# Patient Record
Sex: FEMALE | Race: WHITE | HISPANIC OR LATINO
[De-identification: names, ages, dates, MRNs, and addresses within clinical notes are randomized per-mention and may not be internally consistent; named-entity substitution may affect disease eponyms.]

---

## 2020-01-27 PROBLEM — Z00.00 ENCOUNTER FOR PREVENTIVE HEALTH EXAMINATION: Status: ACTIVE | Noted: 2020-01-27

## 2020-02-03 ENCOUNTER — APPOINTMENT (OUTPATIENT)
Dept: ANTEPARTUM | Facility: CLINIC | Age: 30
End: 2020-02-03
Payer: COMMERCIAL

## 2020-02-03 ENCOUNTER — ASOB RESULT (OUTPATIENT)
Age: 30
End: 2020-02-03

## 2020-02-03 PROCEDURE — 36416 COLLJ CAPILLARY BLOOD SPEC: CPT

## 2020-02-03 PROCEDURE — 76813 OB US NUCHAL MEAS 1 GEST: CPT

## 2020-02-04 ENCOUNTER — TRANSCRIPTION ENCOUNTER (OUTPATIENT)
Age: 30
End: 2020-02-04

## 2020-02-07 LAB
1ST TRIMESTER DATA: NORMAL
ADDENDUM DOC: NORMAL
AFP PNL SERPL: NORMAL
AFP SERPL-ACNC: NORMAL
CLINICAL BIOCHEMIST REVIEW: NORMAL
FREE BETA HCG 1ST TRIMESTER: NORMAL
Lab: NORMAL
NASAL BONE: PRESENT
NOTES NTD: NORMAL
NT: NORMAL
PAPP-A SERPL-ACNC: NORMAL
TRISOMY 18/3: NORMAL

## 2020-02-08 ENCOUNTER — APPOINTMENT (OUTPATIENT)
Dept: ANTEPARTUM | Facility: CLINIC | Age: 30
End: 2020-02-08

## 2020-02-08 ENCOUNTER — APPOINTMENT (OUTPATIENT)
Dept: MATERNAL FETAL MEDICINE | Facility: CLINIC | Age: 30
End: 2020-02-08
Payer: COMMERCIAL

## 2020-02-08 ENCOUNTER — ASOB RESULT (OUTPATIENT)
Age: 30
End: 2020-02-08

## 2020-02-08 PROCEDURE — 99201 OFFICE OUTPATIENT NEW 10 MINUTES: CPT

## 2020-02-20 ENCOUNTER — APPOINTMENT (OUTPATIENT)
Dept: ANTEPARTUM | Facility: CLINIC | Age: 30
End: 2020-02-20

## 2020-02-20 ENCOUNTER — APPOINTMENT (OUTPATIENT)
Dept: MATERNAL FETAL MEDICINE | Facility: CLINIC | Age: 30
End: 2020-02-20

## 2020-02-24 ENCOUNTER — APPOINTMENT (OUTPATIENT)
Dept: ANTEPARTUM | Facility: CLINIC | Age: 30
End: 2020-02-24

## 2020-02-25 ENCOUNTER — APPOINTMENT (OUTPATIENT)
Dept: ANTEPARTUM | Facility: CLINIC | Age: 30
End: 2020-02-25
Payer: COMMERCIAL

## 2020-02-25 ENCOUNTER — LABORATORY RESULT (OUTPATIENT)
Age: 30
End: 2020-02-25

## 2020-02-25 ENCOUNTER — ASOB RESULT (OUTPATIENT)
Age: 30
End: 2020-02-25

## 2020-02-25 ENCOUNTER — APPOINTMENT (OUTPATIENT)
Dept: MATERNAL FETAL MEDICINE | Facility: CLINIC | Age: 30
End: 2020-02-25
Payer: COMMERCIAL

## 2020-02-25 PROCEDURE — 99213 OFFICE O/P EST LOW 20 MIN: CPT

## 2020-02-25 PROCEDURE — 76946 ECHO GUIDE FOR AMNIOCENTESIS: CPT

## 2020-02-25 PROCEDURE — 76811 OB US DETAILED SNGL FETUS: CPT

## 2020-03-24 ENCOUNTER — APPOINTMENT (OUTPATIENT)
Dept: ANTEPARTUM | Facility: CLINIC | Age: 30
End: 2020-03-24

## 2020-03-25 ENCOUNTER — ASOB RESULT (OUTPATIENT)
Age: 30
End: 2020-03-25

## 2020-03-25 ENCOUNTER — APPOINTMENT (OUTPATIENT)
Dept: ANTEPARTUM | Facility: CLINIC | Age: 30
End: 2020-03-25
Payer: COMMERCIAL

## 2020-03-25 ENCOUNTER — APPOINTMENT (OUTPATIENT)
Dept: MATERNAL FETAL MEDICINE | Facility: CLINIC | Age: 30
End: 2020-03-25
Payer: COMMERCIAL

## 2020-03-25 VITALS
OXYGEN SATURATION: 98 % | HEART RATE: 70 BPM | DIASTOLIC BLOOD PRESSURE: 70 MMHG | RESPIRATION RATE: 18 BRPM | SYSTOLIC BLOOD PRESSURE: 114 MMHG | BODY MASS INDEX: 24.49 KG/M2 | WEIGHT: 152.38 LBS | HEIGHT: 66 IN

## 2020-03-25 VITALS — TEMPERATURE: 98 F

## 2020-03-25 DIAGNOSIS — Z82.49 FAMILY HISTORY OF ISCHEMIC HEART DISEASE AND OTHER DISEASES OF THE CIRCULATORY SYSTEM: ICD-10-CM

## 2020-03-25 DIAGNOSIS — Z83.49 FAMILY HISTORY OF OTHER ENDOCRINE, NUTRITIONAL AND METABOLIC DISEASES: ICD-10-CM

## 2020-03-25 DIAGNOSIS — Z86.2 PERSONAL HISTORY OF DISEASES OF THE BLOOD AND BLOOD-FORMING ORGANS AND CERTAIN DISORDERS INVOLVING THE IMMUNE MECHANISM: ICD-10-CM

## 2020-03-25 PROCEDURE — 76811 OB US DETAILED SNGL FETUS: CPT

## 2020-03-25 PROCEDURE — 99243 OFF/OP CNSLTJ NEW/EST LOW 30: CPT | Mod: 25

## 2020-03-25 RX ORDER — VITAMIN C, CALCIUM, IRON, VITAMIN D3, VITAMIN E, VITAMIN B1, VITAMIN B2, VITAMIN B3, VITAMIN B6, FOLIC ACID, IODINE, ZINC, COPPER, DOCUSATE SODIUM, DOCOSAHEXAENOIC ACID (DHA) 27-1-50 MG
KIT ORAL
Refills: 0 | Status: ACTIVE | COMMUNITY

## 2020-03-25 RX ORDER — OMEGA-3/DHA/EPA/FISH OIL 300-1000MG
1000 CAPSULE ORAL
Refills: 0 | Status: ACTIVE | COMMUNITY

## 2020-06-15 ENCOUNTER — ASOB RESULT (OUTPATIENT)
Age: 30
End: 2020-06-15

## 2020-06-15 ENCOUNTER — APPOINTMENT (OUTPATIENT)
Dept: ANTEPARTUM | Facility: CLINIC | Age: 30
End: 2020-06-15
Payer: COMMERCIAL

## 2020-06-15 PROCEDURE — 76820 UMBILICAL ARTERY ECHO: CPT

## 2020-06-15 PROCEDURE — 76821 MIDDLE CEREBRAL ARTERY ECHO: CPT

## 2020-06-15 PROCEDURE — 76819 FETAL BIOPHYS PROFIL W/O NST: CPT

## 2020-06-15 PROCEDURE — 76816 OB US FOLLOW-UP PER FETUS: CPT

## 2020-06-15 PROCEDURE — 93976 VASCULAR STUDY: CPT

## 2020-07-13 ENCOUNTER — ASOB RESULT (OUTPATIENT)
Age: 30
End: 2020-07-13

## 2020-07-13 ENCOUNTER — APPOINTMENT (OUTPATIENT)
Dept: ANTEPARTUM | Facility: CLINIC | Age: 30
End: 2020-07-13
Payer: COMMERCIAL

## 2020-07-13 PROCEDURE — 76816 OB US FOLLOW-UP PER FETUS: CPT

## 2020-08-04 ENCOUNTER — INPATIENT (INPATIENT)
Facility: HOSPITAL | Age: 30
LOS: 1 days | Discharge: HOME | End: 2020-08-06
Attending: OBSTETRICS & GYNECOLOGY | Admitting: OBSTETRICS & GYNECOLOGY
Payer: COMMERCIAL

## 2020-08-04 VITALS
RESPIRATION RATE: 16 BRPM | DIASTOLIC BLOOD PRESSURE: 68 MMHG | TEMPERATURE: 98 F | SYSTOLIC BLOOD PRESSURE: 112 MMHG | HEART RATE: 86 BPM

## 2020-08-04 DIAGNOSIS — O47.1 FALSE LABOR AT OR AFTER 37 COMPLETED WEEKS OF GESTATION: ICD-10-CM

## 2020-08-04 DIAGNOSIS — O26.893 OTHER SPECIFIED PREGNANCY RELATED CONDITIONS, THIRD TRIMESTER: ICD-10-CM

## 2020-08-04 LAB
BASOPHILS # BLD AUTO: 0.05 K/UL — SIGNIFICANT CHANGE UP (ref 0–0.2)
BASOPHILS NFR BLD AUTO: 0.4 % — SIGNIFICANT CHANGE UP (ref 0–2)
BLD GP AB SCN SERPL QL: SIGNIFICANT CHANGE UP
EOSINOPHIL # BLD AUTO: 0.09 K/UL — SIGNIFICANT CHANGE UP (ref 0–0.5)
EOSINOPHIL NFR BLD AUTO: 0.7 % — SIGNIFICANT CHANGE UP (ref 0–6)
HCT VFR BLD CALC: 39.2 % — SIGNIFICANT CHANGE UP (ref 34.5–45)
HGB BLD-MCNC: 13.3 G/DL — SIGNIFICANT CHANGE UP (ref 11.5–15.5)
IMM GRANULOCYTES NFR BLD AUTO: 0.5 % — SIGNIFICANT CHANGE UP (ref 0–1.5)
LYMPHOCYTES # BLD AUTO: 15.5 % — SIGNIFICANT CHANGE UP (ref 13–44)
LYMPHOCYTES # BLD AUTO: 2.07 K/UL — SIGNIFICANT CHANGE UP (ref 1–3.3)
MCHC RBC-ENTMCNC: 30.6 PG — SIGNIFICANT CHANGE UP (ref 27–34)
MCHC RBC-ENTMCNC: 33.9 GM/DL — SIGNIFICANT CHANGE UP (ref 32–36)
MCV RBC AUTO: 90.1 FL — SIGNIFICANT CHANGE UP (ref 80–100)
MONOCYTES # BLD AUTO: 0.84 K/UL — SIGNIFICANT CHANGE UP (ref 0–0.9)
MONOCYTES NFR BLD AUTO: 6.3 % — SIGNIFICANT CHANGE UP (ref 2–14)
NEUTROPHILS # BLD AUTO: 10.21 K/UL — HIGH (ref 1.8–7.4)
NEUTROPHILS NFR BLD AUTO: 76.6 % — SIGNIFICANT CHANGE UP (ref 43–77)
PLATELET # BLD AUTO: 199 K/UL — SIGNIFICANT CHANGE UP (ref 150–400)
RBC # BLD: 4.35 M/UL — SIGNIFICANT CHANGE UP (ref 3.8–5.2)
RBC # FLD: 12.6 % — SIGNIFICANT CHANGE UP (ref 10.3–14.5)
SARS-COV-2 RNA SPEC QL NAA+PROBE: SIGNIFICANT CHANGE UP
WBC # BLD: 13.33 K/UL — HIGH (ref 3.8–10.5)
WBC # FLD AUTO: 13.33 K/UL — HIGH (ref 3.8–10.5)

## 2020-08-04 PROCEDURE — 59409 OBSTETRICAL CARE: CPT

## 2020-08-04 RX ORDER — OXYTOCIN 10 UNIT/ML
333.33 VIAL (ML) INJECTION
Qty: 20 | Refills: 0 | Status: DISCONTINUED | OUTPATIENT
Start: 2020-08-04 | End: 2020-08-06

## 2020-08-04 RX ORDER — CITRIC ACID/SODIUM CITRATE 300-500 MG
30 SOLUTION, ORAL ORAL ONCE
Refills: 0 | Status: DISCONTINUED | OUTPATIENT
Start: 2020-08-04 | End: 2020-08-05

## 2020-08-04 RX ORDER — SODIUM CHLORIDE 9 MG/ML
1000 INJECTION, SOLUTION INTRAVENOUS
Refills: 0 | Status: DISCONTINUED | OUTPATIENT
Start: 2020-08-04 | End: 2020-08-05

## 2020-08-04 RX ADMIN — Medication 1000 MILLIUNIT(S)/MIN: at 23:20

## 2020-08-04 RX ADMIN — Medication 30 MILLIGRAM(S): at 23:39

## 2020-08-04 NOTE — OB RN DELIVERY SUMMARY - NS_SEPSISRSKCALC_OBGYN_ALL_OB_FT
GBS status in the 'Prenatal Lab tests/results section' on the OB RN Patient Profile must be documented. EOS calculated successfully. EOS Risk Factor: 0.5/1000 live births ([facility] national incidence); GA=39w3d; Temp=98.8; ROM=0.2; GBS='Negative'; Antibiotics='No antibiotics or any antibiotics < 2 hrs prior to birth'

## 2020-08-04 NOTE — OB PROVIDER H&P - ATTENDING COMMENTS
Patient was seen and evaluated at bedside at approx. 9:50 PM. This was my first encounter with the patient.  29 yo P0 at 39+3 weeks in active labor CAT 1 FHT.  Patient had low PAPPA otherwise uncomplicated pregnancy. Exam as above.  I agree with above resident note. GBS negative. Admit for labor management. Expectant management for now.    JOCELYNN Zuñiga MD

## 2020-08-04 NOTE — OB PROVIDER H&P - ASSESSMENT
Tammi Couch is a 31 yo  at 39 weeks 3days presenting to L&D in active labor.    - continuous fetal heart monitoring  - pt denies wanting an epidural at this moment  - expectant management  - f/u Type and Screen  - f/u Covid  - f/u PNL    Discussed with Dr. Zuñiga Tammi Couch is a 29 yo  at 39 weeks 3days presenting to L&D in active labor.    - admission labs  - continuous fetal heart monitoring  - pt denies wanting an epidural at this moment  - expectant management  - baby at increased risk of Trisomy 18    Discussed with Dr. Zuñiga

## 2020-08-04 NOTE — OB PROVIDER TRIAGE NOTE - NSHPPHYSICALEXAM_GEN_ALL_CORE
Vital Signs Last 24 Hrs  T(C): 36.8 (04 Aug 2020 19:23), Max: 36.8 (04 Aug 2020 19:18)  T(F): 98.24 (04 Aug 2020 19:23), Max: 98.24 (04 Aug 2020 19:23)  HR: 86 (04 Aug 2020 19:23) (86 - 86)  BP: 112/68 (04 Aug 2020 19:23) (112/68 - 112/68)  RR: 16 (04 Aug 2020 19:18) (16 - 16)    Gen: NAD, alert and oriented  CV: RRR  Resp: CTAB  Abd: Soft, gravid  : Suprapubic tenderness  Extremities: Nontender, no edema    FHR: 150bpm with moderate variability +accels/+decels   TOCO: q4-5 min O:    Vital Signs Last 24 Hrs  T(C): 36.8 (04 Aug 2020 19:23), Max: 36.8 (04 Aug 2020 19:18)  T(F): 98.24 (04 Aug 2020 19:23), Max: 98.24 (04 Aug 2020 19:23)  HR: 86 (04 Aug 2020 19:23) (86 - 86)  BP: 112/68 (04 Aug 2020 19:23) (112/68 - 112/68)  RR: 16 (04 Aug 2020 19:18) (16 - 16)    Gen: NAD, alert and oriented  CV: RRR  Resp: CTAB  Abd: Soft, gravid  : Suprapubic tenderness  Extremities: Nontender, no edema  CVE:     FHR: Baseline of 150bpm with moderate variability/ +accels/ -decels   TOCO: q4-5 min O:    Vital Signs Last 24 Hrs  T(C): 36.8 (04 Aug 2020 19:23), Max: 36.8 (04 Aug 2020 19:18)  T(F): 98.24 (04 Aug 2020 19:23), Max: 98.24 (04 Aug 2020 19:23)  HR: 86 (04 Aug 2020 19:23) (86 - 86)  BP: 112/68 (04 Aug 2020 19:23) (112/68 - 112/68)  RR: 16 (04 Aug 2020 19:18) (16 - 16)    Gen: NAD, alert and oriented  CV: RRR  Resp: CTAB  Abd: Soft, gravid  : Suprapubic tenderness  Extremities: Nontender, no edema  CVE:     FHR: Baseline of 150bpm with moderate variability/ +accels/ -decels   TOCO: regular q4-5 min

## 2020-08-04 NOTE — OB PROVIDER TRIAGE NOTE - HISTORY OF PRESENT ILLNESS
Tammi Couch is a 29 yo  at 39 wks 3d presenting to L&D with contractions and spotting.  Contractions become regular this morning (q4-5 minutes). [place] contractions were present last  and she experienced dark brown spotting.  She lost mucous plug last night.    This is pregnancy significant for low MELVIN-A.  No headaches, vision changes, chest pain, leg swelling, no burning with urination  Has had a few instances of difficulty breathing.    FM+ LOF- VB+ Contractions +  GBS-    ObH:        G1: Ectopic 2016, passed naturally        G2: Current pregnancy 39wk3d, low MELVIN-A  PMH: Hashimoto's thyroiditis  Surg: None   Social: feels safe, , denies alcohol/smoking/drugs during pregnancy  Meds: Synthroid 125, PNV, aspirin 81mg (2x a day, discontinued 3 wks ago)  Allergies: NKDA S:    Tammi Couch is a 29 yo  at 39 wks 3d presenting to L&D with contractions and spotting.  Contractions become regular this morning (q4-5 minutes). [place] contractions were present last  and she experienced dark brown spotting.  She lost mucous plug last night.    This is pregnancy significant for low MELVIN-A.  No headaches, vision changes, chest pain, leg swelling, no burning with urination  Has had a few instances of difficulty breathing.    FM+ LOF- VB+ Contractions +  GBS-    ObH:        G1: Ectopic 2016, passed naturally        G2: Current pregnancy 39wk3d, low MELVIN-A  PMH: Hashimoto's thyroiditis  Surg: None   Social: feels safe, , denies alcohol/smoking/drugs during pregnancy  Meds: PNV, Synthroid 125, aspirin 81mg (2x a day, discontinued 3 wks ago), iron, fish oil  Allergies: NKDA

## 2020-08-04 NOTE — OB PROVIDER TRIAGE NOTE - NSOBPROVIDERNOTE_OBGYN_ALL_OB_FT
A/P    Tammishanae Liskmichelle is a 31 yo  at 39 wks 3d presenting to L&D with contractions and spotting for r/o labor.    - Admit to L&D   - Continue monitoring TOCO and FHR  - Do Nitrazine test for possible ROM A/P    Tammi Jovannimichelle is a 31 yo  at 39 wks 3d presenting to L&D with contractions and spotting for r/o labor.    - Admit to L&D   - Continue monitoring TOCO and FHR

## 2020-08-04 NOTE — OB PROVIDER H&P - NSHPPHYSICALEXAM_GEN_ALL_CORE
ICU Vital Signs Last 24 Hrs  T(C): 36.8 (04 Aug 2020 19:23), Max: 36.8 (04 Aug 2020 19:18)  T(F): 98.24 (04 Aug 2020 19:23), Max: 98.24 (04 Aug 2020 19:23)  HR: 86 (04 Aug 2020 21:03) (86 - 86)  BP: 118/76 (04 Aug 2020 21:03) (112/68 - 118/76)  RR: 16 (04 Aug 2020 19:18) (16 - 16)    Gen: distressed with contractions  Pulm: CTAB  Card: RRR  Abd: gravid, non-tender ICU Vital Signs Last 24 Hrs  T(C): 36.8 (04 Aug 2020 19:23), Max: 36.8 (04 Aug 2020 19:18)  T(F): 98.24 (04 Aug 2020 19:23), Max: 98.24 (04 Aug 2020 19:23)  HR: 86 (04 Aug 2020 21:03) (86 - 86)  BP: 118/76 (04 Aug 2020 21:03) (112/68 - 118/76)  RR: 16 (04 Aug 2020 19:18) (16 - 16)    Gen: distressed with contractions  Pulm: CTAB  Card: RRR  Abd: gravid, non-tender    FHT: 150 baseline/moderate variability/ + accels/ - decels  [place]: q2-4 minutes

## 2020-08-05 ENCOUNTER — TRANSCRIPTION ENCOUNTER (OUTPATIENT)
Age: 30
End: 2020-08-05

## 2020-08-05 LAB
ABO RH CONFIRMATION: SIGNIFICANT CHANGE UP
HCT VFR BLD CALC: 29.7 % — LOW (ref 34.5–45)
HGB BLD-MCNC: 10.1 G/DL — LOW (ref 11.5–15.5)
MCHC RBC-ENTMCNC: 31.5 PG — SIGNIFICANT CHANGE UP (ref 27–34)
MCHC RBC-ENTMCNC: 34 GM/DL — SIGNIFICANT CHANGE UP (ref 32–36)
MCV RBC AUTO: 92.5 FL — SIGNIFICANT CHANGE UP (ref 80–100)
PLATELET # BLD AUTO: 177 K/UL — SIGNIFICANT CHANGE UP (ref 150–400)
RBC # BLD: 3.21 M/UL — LOW (ref 3.8–5.2)
RBC # FLD: 12.7 % — SIGNIFICANT CHANGE UP (ref 10.3–14.5)
T PALLIDUM AB TITR SER: NEGATIVE — SIGNIFICANT CHANGE UP
WBC # BLD: 13.45 K/UL — HIGH (ref 3.8–10.5)
WBC # FLD AUTO: 13.45 K/UL — HIGH (ref 3.8–10.5)

## 2020-08-05 RX ORDER — BENZOCAINE 10 %
1 GEL (GRAM) MUCOUS MEMBRANE EVERY 6 HOURS
Refills: 0 | Status: DISCONTINUED | OUTPATIENT
Start: 2020-08-05 | End: 2020-08-06

## 2020-08-05 RX ORDER — TETANUS TOXOID, REDUCED DIPHTHERIA TOXOID AND ACELLULAR PERTUSSIS VACCINE, ADSORBED 5; 2.5; 8; 8; 2.5 [IU]/.5ML; [IU]/.5ML; UG/.5ML; UG/.5ML; UG/.5ML
0.5 SUSPENSION INTRAMUSCULAR ONCE
Refills: 0 | Status: COMPLETED | OUTPATIENT
Start: 2020-08-05

## 2020-08-05 RX ORDER — KETOROLAC TROMETHAMINE 30 MG/ML
30 SYRINGE (ML) INJECTION ONCE
Refills: 0 | Status: DISCONTINUED | OUTPATIENT
Start: 2020-08-05 | End: 2020-08-04

## 2020-08-05 RX ORDER — SODIUM CHLORIDE 9 MG/ML
3 INJECTION INTRAMUSCULAR; INTRAVENOUS; SUBCUTANEOUS EVERY 8 HOURS
Refills: 0 | Status: DISCONTINUED | OUTPATIENT
Start: 2020-08-05 | End: 2020-08-06

## 2020-08-05 RX ORDER — OXYTOCIN 10 UNIT/ML
333.33 VIAL (ML) INJECTION
Qty: 20 | Refills: 0 | Status: DISCONTINUED | OUTPATIENT
Start: 2020-08-05 | End: 2020-08-06

## 2020-08-05 RX ORDER — ACETAMINOPHEN 500 MG
975 TABLET ORAL
Refills: 0 | Status: DISCONTINUED | OUTPATIENT
Start: 2020-08-05 | End: 2020-08-06

## 2020-08-05 RX ORDER — TETANUS TOXOID, REDUCED DIPHTHERIA TOXOID AND ACELLULAR PERTUSSIS VACCINE, ADSORBED 5; 2.5; 8; 8; 2.5 [IU]/.5ML; [IU]/.5ML; UG/.5ML; UG/.5ML; UG/.5ML
0.5 SUSPENSION INTRAMUSCULAR ONCE
Refills: 0 | Status: COMPLETED | OUTPATIENT
Start: 2020-08-05 | End: 2020-08-05

## 2020-08-05 RX ORDER — DIBUCAINE 1 %
1 OINTMENT (GRAM) RECTAL EVERY 6 HOURS
Refills: 0 | Status: DISCONTINUED | OUTPATIENT
Start: 2020-08-05 | End: 2020-08-06

## 2020-08-05 RX ORDER — IBUPROFEN 200 MG
600 TABLET ORAL EVERY 6 HOURS
Refills: 0 | Status: DISCONTINUED | OUTPATIENT
Start: 2020-08-05 | End: 2020-08-06

## 2020-08-05 RX ORDER — IBUPROFEN 200 MG
600 TABLET ORAL EVERY 6 HOURS
Refills: 0 | Status: COMPLETED | OUTPATIENT
Start: 2020-08-05 | End: 2021-07-04

## 2020-08-05 RX ORDER — SIMETHICONE 80 MG/1
80 TABLET, CHEWABLE ORAL EVERY 4 HOURS
Refills: 0 | Status: DISCONTINUED | OUTPATIENT
Start: 2020-08-05 | End: 2020-08-06

## 2020-08-05 RX ORDER — PRAMOXINE HYDROCHLORIDE 150 MG/15G
1 AEROSOL, FOAM RECTAL EVERY 4 HOURS
Refills: 0 | Status: DISCONTINUED | OUTPATIENT
Start: 2020-08-05 | End: 2020-08-06

## 2020-08-05 RX ORDER — AER TRAVELER 0.5 G/1
1 SOLUTION RECTAL; TOPICAL EVERY 4 HOURS
Refills: 0 | Status: DISCONTINUED | OUTPATIENT
Start: 2020-08-05 | End: 2020-08-06

## 2020-08-05 RX ORDER — HYDROCORTISONE 1 %
1 OINTMENT (GRAM) TOPICAL EVERY 6 HOURS
Refills: 0 | Status: DISCONTINUED | OUTPATIENT
Start: 2020-08-05 | End: 2020-08-06

## 2020-08-05 RX ORDER — DIPHENHYDRAMINE HCL 50 MG
25 CAPSULE ORAL EVERY 6 HOURS
Refills: 0 | Status: DISCONTINUED | OUTPATIENT
Start: 2020-08-05 | End: 2020-08-06

## 2020-08-05 RX ORDER — MAGNESIUM HYDROXIDE 400 MG/1
30 TABLET, CHEWABLE ORAL
Refills: 0 | Status: DISCONTINUED | OUTPATIENT
Start: 2020-08-05 | End: 2020-08-06

## 2020-08-05 RX ORDER — LANOLIN
1 OINTMENT (GRAM) TOPICAL EVERY 6 HOURS
Refills: 0 | Status: DISCONTINUED | OUTPATIENT
Start: 2020-08-05 | End: 2020-08-06

## 2020-08-05 RX ORDER — OXYCODONE HYDROCHLORIDE 5 MG/1
5 TABLET ORAL ONCE
Refills: 0 | Status: DISCONTINUED | OUTPATIENT
Start: 2020-08-05 | End: 2020-08-06

## 2020-08-05 RX ORDER — OXYCODONE HYDROCHLORIDE 5 MG/1
5 TABLET ORAL
Refills: 0 | Status: DISCONTINUED | OUTPATIENT
Start: 2020-08-05 | End: 2020-08-06

## 2020-08-05 RX ADMIN — Medication 975 MILLIGRAM(S): at 15:15

## 2020-08-05 RX ADMIN — Medication 975 MILLIGRAM(S): at 10:18

## 2020-08-05 RX ADMIN — TETANUS TOXOID, REDUCED DIPHTHERIA TOXOID AND ACELLULAR PERTUSSIS VACCINE, ADSORBED 0.5 MILLILITER(S): 5; 2.5; 8; 8; 2.5 SUSPENSION INTRAMUSCULAR at 22:04

## 2020-08-05 RX ADMIN — Medication 600 MILLIGRAM(S): at 13:20

## 2020-08-05 RX ADMIN — Medication 30 MILLIGRAM(S): at 00:09

## 2020-08-05 RX ADMIN — Medication 975 MILLIGRAM(S): at 11:00

## 2020-08-05 RX ADMIN — SODIUM CHLORIDE 3 MILLILITER(S): 9 INJECTION INTRAMUSCULAR; INTRAVENOUS; SUBCUTANEOUS at 15:17

## 2020-08-05 RX ADMIN — Medication 975 MILLIGRAM(S): at 16:11

## 2020-08-05 RX ADMIN — Medication 1 TABLET(S): at 12:26

## 2020-08-05 RX ADMIN — Medication 600 MILLIGRAM(S): at 12:26

## 2020-08-05 NOTE — PROGRESS NOTE ADULT - SUBJECTIVE AND OBJECTIVE BOX
ABIOLA ALONZO is a 31yo  now PPD#1 s/p spontaneous vaginal delivery at 39 3/7 weeks gestation    S:    The patient reports some heavy bleeding and clots overnight  She denies dizziness, lightheadedness, chest pain, shortness of breath  Pain controlled with current medications.   She is ambulating without difficulty and tolerating PO   - flatus/-BM/+ voiding     O:    T(C): 36.8 (20 @ 04:28), Max: 37.1 (20 @ 21:01)  HR: 86 (20 @ 04:28) (86 - 96)  BP: 113/67 (20 @ 04:28) (95/60 - 118/76)  RR: 18 (20 @ 04:28) (16 - 18)  SpO2: 96% (20 @ 04:28) (96% - 96%)    Gen: NAD, AOx3  CV: RRR  Pulm: CTAB  Breast: nontender, non-engorged   Abdomen:  soft, non-tender, non-distended, +bowel sounds.  Uterus:  Fundus firm below umbilicus, heavy trickle of blood with firm fundal pressure  VE:  +lochia  Ext:  Non-tender.                          13.3   13.33 )-----------( 199      ( 04 Aug 2020 21:52 )             39.2

## 2020-08-05 NOTE — OB PROVIDER DELIVERY SUMMARY - NSPROVIDERDELIVERYNOTE_OBGYN_ALL_OB_FT
Patient felt rectal pressure and was found to be fully dilated, +1 station. She pushed effectively for 20 minutes, and delivered a viable female infant at 2320. Vertex delivered without difficulty, both shoulders then delivered without difficulty. Placenta delivered spontaneously and intact at 2324. Pitocin started.   Uterus, cerivx, perineum and vagina were inspected and bilateral sulcal tears and 2nd degree lacerations were noted and repaired with 2.0 and 3.0 chromic. Excellent hemostasis was achieved. Apgars 9,9, EBL 350ml. Patient felt rectal pressure and was found to be fully dilated, +1 station. She pushed effectively for 20 minutes, and delivered a viable female infant at 2320. Vertex delivered without difficulty, both shoulders then delivered without difficulty. Placenta delivered spontaneously and intact at 2324. Pitocin started.   Uterus, cervix, perineum and vagina were inspected and bilateral sulcal tears and 2nd degree lacerations were noted and repaired with 2.0 and 3.0 vycril.  Excellent hemostasis was achieved. Apgars 9,9, EBL 350ml.  ATTENDING NOTE  Uncomplicated vaginal delivery, Vaginal laceration repair done with the resident, Neonatologist present due to heavy meconium.  JOCELYNN Zuñiga MD

## 2020-08-05 NOTE — DISCHARGE NOTE OB - CARE PLAN
Principal Discharge DX:	 (normal spontaneous vaginal delivery)  Goal:	Rapid recovery  Assessment and plan of treatment:	Patient should transition to regular activity level. Resume regular diet. Patient should follow up with her OB for a postpartum checkup 4-6 weeks after delivery. Patient should call her doctor sooner if she develops a fever or uncontrolled vaginal bleeding. Please call sooner if there are any other concerns.  Secondary Diagnosis:	Hashimoto's disease  Goal:	maintenance  Assessment and plan of treatment:	-continue taking synthroid

## 2020-08-05 NOTE — DISCHARGE NOTE OB - CARE PROVIDER_API CALL
Alejandro Booth  OBSTETRICS AND GYNECOLOGY  [address] [place] [address]  Phone: [phone]  Fax: [phone]  Follow Up Time:

## 2020-08-05 NOTE — PROGRESS NOTE ADULT - SUBJECTIVE AND OBJECTIVE BOX
PPD # 1    patient resting comfortably in NAD  Afebrile  VSS  Fundus  Firm  Not tender  Extrem.  No Homans  Lochia  Nl    Voiding  ++  Flatus ++    O ++  WBC 13.4  H/H 10.1 / 29.7  Platel. 177  VDRL Neg    Patient s/p   Patient stable and doing well  Continue post partum care

## 2020-08-05 NOTE — DISCHARGE NOTE OB - PATIENT PORTAL LINK FT
You can access the FollowMyHealth Patient Portal offered by [facility] by registering at the following website: [email or web address]. By joining [employer]’s FollowMyHealth portal, you will also be able to view your health information using other applications (apps) compatible with our system.

## 2020-08-05 NOTE — PROGRESS NOTE ADULT - ASSESSMENT
A/P:  Patient is a 29yo  now PPD#1 s/p spontaneous vaginal delivery at 39 3/7 weeks gestation  -Vital Signs Stable  -Voiding, tolerating PO, bowel sounds nml  -Moderate to Heavy lochia this AM, will do CBC and pad count. Will consider uterotonic medication if continues   -Advance care as tolerated   -Continue routine postpartum care and education.  -Healthy female infant A/P:  Patient is a 29yo  now PPD#1 s/p spontaneous vaginal delivery at 39 3/7 weeks gestation  -Vital Signs Stable  -Voiding, tolerating PO, bowel sounds nml  -Moderate to Heavy lochia this AM, will do CBC and pad count. Will consider uterotonic medication if continues   -Advance care as tolerated   -Continue routine postpartum care and education.  -Healthy female infant      PGY4 Addendum: Patient seen and examined at bedside. Pad just changed prior to my exam. No bleeding noted. Will continue with pad counts and get CBC this AM which was explained to RN. Dr. Zuñiga in agreement with plan.

## 2020-08-05 NOTE — DISCHARGE NOTE OB - PLAN OF CARE
Rapid recovery Patient should transition to regular activity level. Resume regular diet. Patient should follow up with her OB for a postpartum checkup 4-6 weeks after delivery. Patient should call her doctor sooner if she develops a fever or uncontrolled vaginal bleeding. Please call sooner if there are any other concerns. maintenance -continue taking synthroid

## 2020-08-05 NOTE — DISCHARGE NOTE OB - MEDICATION SUMMARY - MEDICATIONS TO TAKE
I will START or STAY ON the medications listed below when I get home from the hospital:    acetaminophen 325 mg oral tablet  -- 3 tab(s) by mouth every 6 hours, As Needed -for mild pain MDD:4,000 mg   -- Indication: For pain    ibuprofen 600 mg oral tablet  -- 1 tab(s) by mouth every 6 hours, As Needed -for mild pain   -- Indication: For pain    PNV Prenatal oral tablet  -- 1 tab(s) by mouth once a day  -- Indication: For postpartum

## 2020-08-05 NOTE — DISCHARGE NOTE OB - HOSPITAL COURSE
She is [age] yo now  who presented at 39 weeks 3 days GA in active labor and had a normal delivery. She had a normal postpartum course and was discharged home in stable condition on postpartum day 2. Upon discharge she was voiding, tolerating PO, and ambulating.

## 2020-08-06 VITALS
RESPIRATION RATE: 18 BRPM | HEART RATE: 72 BPM | TEMPERATURE: 98 F | OXYGEN SATURATION: 97 % | DIASTOLIC BLOOD PRESSURE: 52 MMHG | SYSTOLIC BLOOD PRESSURE: 91 MMHG

## 2020-08-06 LAB
HCT VFR BLD CALC: 26.4 % — LOW (ref 34.5–45)
HGB BLD-MCNC: 8.7 G/DL — LOW (ref 11.5–15.5)
MCHC RBC-ENTMCNC: 30.4 PG — SIGNIFICANT CHANGE UP (ref 27–34)
MCHC RBC-ENTMCNC: 33 GM/DL — SIGNIFICANT CHANGE UP (ref 32–36)
MCV RBC AUTO: 92.3 FL — SIGNIFICANT CHANGE UP (ref 80–100)
PLATELET # BLD AUTO: 164 K/UL — SIGNIFICANT CHANGE UP (ref 150–400)
RBC # BLD: 2.86 M/UL — LOW (ref 3.8–5.2)
RBC # FLD: 13 % — SIGNIFICANT CHANGE UP (ref 10.3–14.5)
WBC # BLD: 10.49 K/UL — SIGNIFICANT CHANGE UP (ref 3.8–10.5)
WBC # FLD AUTO: 10.49 K/UL — SIGNIFICANT CHANGE UP (ref 3.8–10.5)

## 2020-08-06 PROCEDURE — 86900 BLOOD TYPING SEROLOGIC ABO: CPT

## 2020-08-06 PROCEDURE — 86850 RBC ANTIBODY SCREEN: CPT

## 2020-08-06 PROCEDURE — 86901 BLOOD TYPING SEROLOGIC RH(D): CPT

## 2020-08-06 PROCEDURE — 85027 COMPLETE CBC AUTOMATED: CPT

## 2020-08-06 PROCEDURE — 86780 TREPONEMA PALLIDUM: CPT

## 2020-08-06 PROCEDURE — 90715 TDAP VACCINE 7 YRS/> IM: CPT

## 2020-08-06 PROCEDURE — 87635 SARS-COV-2 COVID-19 AMP PRB: CPT

## 2020-08-06 PROCEDURE — 36415 COLL VENOUS BLD VENIPUNCTURE: CPT

## 2020-08-06 RX ORDER — ACETAMINOPHEN 500 MG
3 TABLET ORAL
Qty: 120 | Refills: 0
Start: 2020-08-06 | End: 2020-08-15

## 2020-08-06 RX ORDER — LEVOTHYROXINE SODIUM 125 MCG
1 TABLET ORAL
Qty: 0 | Refills: 0 | DISCHARGE

## 2020-08-06 RX ORDER — IBUPROFEN 200 MG
1 TABLET ORAL
Qty: 40 | Refills: 0
Start: 2020-08-06 | End: 2020-08-15

## 2020-08-06 RX ADMIN — Medication 975 MILLIGRAM(S): at 10:34

## 2020-08-06 RX ADMIN — Medication 975 MILLIGRAM(S): at 03:45

## 2020-08-06 RX ADMIN — Medication 975 MILLIGRAM(S): at 11:10

## 2020-08-06 RX ADMIN — Medication 975 MILLIGRAM(S): at 04:16

## 2020-08-06 RX ADMIN — MAGNESIUM HYDROXIDE 30 MILLILITER(S): 400 TABLET, CHEWABLE ORAL at 00:01

## 2020-08-06 NOTE — PROGRESS NOTE ADULT - SUBJECTIVE AND OBJECTIVE BOX
PPD # 2    Patient resting comfortably in NAD  Afebrile  VSS  Abdomen  Soft  Not Tender  Extrem.  No Homans  Vulva / Vagina   Healing well    WBC 10.5  H/H 8.7 / 26.4  Platelet 164    Patient s/p   Stable and doing well  DC to home  F/U office 4 weeks

## 2020-08-06 NOTE — PROGRESS NOTE ADULT - SUBJECTIVE AND OBJECTIVE BOX
ABIOLA [id number]  Postpartum Note Vaginal Delivery  Patient is a 31yo  s/p FT  day 2.    Subjective:  No acute events overnight.   Patient is tolerating diet and denies N/V.   Patient still has slight vaginal bleeding that is decreasing in amount.   She is breastfeeding and the baby is latching on.    Urinating appropriately.   -BM/+flatus.    Physical exam:  Vital Signs Last 24 Hrs  T(C): 36.7 (06 Aug 2020 04:07), Max: 36.9 (05 Aug 2020 15:59)  T(F): 98.1 (06 Aug 2020 04:07), Max: 98.4 (05 Aug 2020 15:59)  HR: 72 (06 Aug 2020 04:07) (72 - 87)  BP: 99/61 (06 Aug 2020 04:07) (98/67 - 99/61)  RR: 18 (06 Aug 2020 04:07) (18 - 18)  SpO2: 99% (06 Aug 2020 04:07) (99% - 99%)    Heart: RRR  Lungs: CTABL  Breast: non tender, not engorged   Abdomen: Soft, nontender, no distension, firm uterine fundus below umbilicus  Ext: No DVT signs, warm extremities    LABS:                        10.1   13.45 )-----------( 177      ( 05 Aug 2020 09:03 )             29.7       acetaminophen   Tablet .. 975 milliGRAM(s) Oral <User Schedule>  benzocaine 20%/menthol 0.5% Spray 1 Spray(s) Topical every 6 hours PRN  dibucaine 1% Ointment 1 Application(s) Topical every 6 hours PRN  diphenhydrAMINE 25 milliGRAM(s) Oral every 6 hours PRN  hydrocortisone 1% Cream 1 Application(s) Topical every 6 hours PRN  ibuprofen  Tablet. 600 milliGRAM(s) Oral every 6 hours  lanolin Ointment 1 Application(s) Topical every 6 hours PRN  magnesium hydroxide Suspension 30 milliLiter(s) Oral two times a day PRN  oxyCODONE    IR 5 milliGRAM(s) Oral every 3 hours PRN  oxyCODONE    IR 5 milliGRAM(s) Oral once PRN  oxytocin Infusion 333.333 milliUNIT(s)/Min IV Continuous <Continuous>  oxytocin Infusion 333.333 milliUNIT(s)/Min IV Continuous <Continuous>  pramoxine 1%/zinc 5% Cream 1 Application(s) Topical every 4 hours PRN  prenatal multivitamin 1 Tablet(s) Oral daily  simethicone 80 milliGRAM(s) Chew every 4 hours PRN  sodium chloride 0.9% lock flush 3 milliLiter(s) IV Push every 8 hours  witch hazel Pads 1 Application(s) Topical every 4 hours PRN

## 2020-08-06 NOTE — PROGRESS NOTE ADULT - ASSESSMENT
A/P  Patient is s/p  day# 2  Patient is feeling well and reports no issues.     Continue the current management with current pain medication regimen.   Encourage ambulation and a regular diet.   Discharge home according to the normal criteria. A/P  Patient is s/p  day# 2  Patient is feeling well and reports no issues.     Continue the current management with current pain medication regimen.   Encourage ambulation and a regular diet.   Discharge home according to the normal criteria.    PGY4: Patient seen and examined at bedside. Agree with the above. Hgb 7.5 started on iron. CBC pending at 1800. A/P  Patient is s/p  day# 2  Patient is feeling well and reports no issues.     Continue the current management with current pain medication regimen.   Encourage ambulation and a regular diet.   Discharge home according to the normal criteria.    PGY4: Patient seen and examined at bedside. Agree with the above.

## 2021-09-28 ENCOUNTER — APPOINTMENT (OUTPATIENT)
Dept: ANTEPARTUM | Facility: CLINIC | Age: 31
End: 2021-09-28
Payer: COMMERCIAL

## 2021-09-28 ENCOUNTER — ASOB RESULT (OUTPATIENT)
Age: 31
End: 2021-09-28

## 2021-09-28 ENCOUNTER — NON-APPOINTMENT (OUTPATIENT)
Age: 31
End: 2021-09-28

## 2021-09-28 PROBLEM — E06.3 AUTOIMMUNE THYROIDITIS: Chronic | Status: ACTIVE | Noted: 2020-08-04

## 2021-09-28 PROBLEM — O00.90 UNSPECIFIED ECTOPIC PREGNANCY WITHOUT INTRAUTERINE PREGNANCY: Chronic | Status: ACTIVE | Noted: 2020-08-04

## 2021-09-28 PROCEDURE — 76815 OB US LIMITED FETUS(S): CPT

## 2021-10-05 ENCOUNTER — APPOINTMENT (OUTPATIENT)
Dept: MATERNAL FETAL MEDICINE | Facility: CLINIC | Age: 31
End: 2021-10-05
Payer: COMMERCIAL

## 2021-10-05 ENCOUNTER — ASOB RESULT (OUTPATIENT)
Age: 31
End: 2021-10-05

## 2021-10-05 PROCEDURE — 99202 OFFICE O/P NEW SF 15 MIN: CPT | Mod: 95

## 2021-10-05 PROCEDURE — 99212 OFFICE O/P EST SF 10 MIN: CPT | Mod: 24,95

## 2021-10-07 ENCOUNTER — APPOINTMENT (OUTPATIENT)
Dept: ANTEPARTUM | Facility: CLINIC | Age: 31
End: 2021-10-07
Payer: COMMERCIAL

## 2021-10-07 VITALS — HEIGHT: 66 IN | WEIGHT: 150 LBS | BODY MASS INDEX: 24.11 KG/M2

## 2021-10-07 PROCEDURE — 36415 COLL VENOUS BLD VENIPUNCTURE: CPT

## 2021-10-11 LAB
2ND TRIMESTER DATA: NORMAL
AFP PNL SERPL: NORMAL
AFP SERPL-ACNC: NORMAL
B-HCG FREE SERPL-MCNC: NORMAL
CLINICAL BIOCHEMIST REVIEW: NORMAL
INHIBIN A SERPL-MCNC: NORMAL
NOTES NTD: NORMAL
U ESTRIOL SERPL-SCNC: NORMAL

## 2021-10-15 ENCOUNTER — NON-APPOINTMENT (OUTPATIENT)
Age: 31
End: 2021-10-15

## 2021-11-02 ENCOUNTER — APPOINTMENT (OUTPATIENT)
Dept: ANTEPARTUM | Facility: CLINIC | Age: 31
End: 2021-11-02
Payer: COMMERCIAL

## 2021-11-02 ENCOUNTER — ASOB RESULT (OUTPATIENT)
Age: 31
End: 2021-11-02

## 2021-11-02 ENCOUNTER — APPOINTMENT (OUTPATIENT)
Dept: MATERNAL FETAL MEDICINE | Facility: CLINIC | Age: 31
End: 2021-11-02
Payer: COMMERCIAL

## 2021-11-02 VITALS
HEART RATE: 74 BPM | DIASTOLIC BLOOD PRESSURE: 62 MMHG | HEIGHT: 65 IN | OXYGEN SATURATION: 98 % | SYSTOLIC BLOOD PRESSURE: 118 MMHG | WEIGHT: 160 LBS | RESPIRATION RATE: 18 BRPM | BODY MASS INDEX: 26.66 KG/M2

## 2021-11-02 DIAGNOSIS — O28.0 ABNORMAL HEMATOLOGICAL FINDING ON ANTENATAL SCREENING OF MOTHER: ICD-10-CM

## 2021-11-02 DIAGNOSIS — O09.899 ABNORMAL HEMATOLOGICAL FINDING ON ANTENATAL SCREENING OF MOTHER: ICD-10-CM

## 2021-11-02 DIAGNOSIS — O28.5 ABNORMAL CHROMOSOMAL AND GENETIC FINDING ON ANTENATAL SCREENING OF MOTHER: ICD-10-CM

## 2021-11-02 DIAGNOSIS — Q91.3 TRISOMY 18, UNSPECIFIED: ICD-10-CM

## 2021-11-02 PROCEDURE — 76811 OB US DETAILED SNGL FETUS: CPT

## 2021-11-02 PROCEDURE — 99214 OFFICE O/P EST MOD 30 MIN: CPT | Mod: 24

## 2021-11-02 RX ORDER — LEVOTHYROXINE SODIUM 137 UG/1
TABLET ORAL
Refills: 0 | Status: ACTIVE | COMMUNITY

## 2021-11-02 RX ORDER — FERROUS SULFATE TAB EC 325 MG (65 MG FE EQUIVALENT) 325 (65 FE) MG
325 (65 FE) TABLET DELAYED RESPONSE ORAL
Refills: 0 | Status: DISCONTINUED | COMMUNITY
End: 2021-11-02

## 2021-11-02 RX ORDER — LEVOTHYROXINE SODIUM 0.12 MG/1
125 TABLET ORAL
Refills: 0 | Status: DISCONTINUED | COMMUNITY
End: 2021-11-02

## 2021-11-02 RX ORDER — ACETAMINOPHEN 500 MG
1000 TABLET ORAL
Refills: 0 | Status: DISCONTINUED | COMMUNITY
End: 2021-11-02

## 2021-11-02 RX ORDER — UBIDECARENONE/VIT E ACET 100MG-5
CAPSULE ORAL
Refills: 0 | Status: ACTIVE | COMMUNITY

## 2021-11-02 RX ORDER — KRILL/OM-3/DHA/EPA/PHOSPHO/AST 1000-230MG
81 CAPSULE ORAL
Refills: 0 | Status: DISCONTINUED | COMMUNITY
End: 2021-11-02

## 2021-11-02 NOTE — DISCUSSION/SUMMARY
[FreeTextEntry1] : The patient is a 31-year-old -0-1-1 being seen today at approximately 20 weeks for short interval pregnancy, hypothyroidism and COVID-19 infection during pregnancy.\par \par Her obstetrical history is significant for delivery in  of a liveborn female  weighing 6 pounds 14 ounces delivered at 39 weeks via normal spontaneous vaginal delivery with that pregnancy complicated by maternal hypothyroidism.  In addition she has had 1 first trimester chemical pregnancy.\par \par A comprehensive ultrasound was performed today and reveals a single viable intrauterine gestation with size consistent with dates.  A single intracardiac echogenic focus is noted.  In addition unilateral fetal pyelectasis at 4.0 mm noted.  The placenta is posterior and low-lying.  Vital signs today revealed blood pressure 118/62 and maternal weight is 160 pounds consistent with a BMI of 26.63 kg.\par \par COVID-19 infection;\par \par The patient had COVID-19 at approximately 10 weeks of pregnancy.  She presented with diarrhea, low-grade fever, nasal congestion and back pain.  She was evaluated and monoclonal antibodies were given.  She has not had her antibody status evaluated recently.  She understands that there are issues related to placental pathology calcifications which would require intervention with anticoagulant therapy.  Those are not seen on today's ultrasound.  Anticoagulant therapy is not recommended.  In addition there have been case reports of moms who have had Covid during pregnancy whose babies have been infected.  Amniocentesis is not recommended.  No other problems or complications are noted.\par \par Maternal hypothyroidism;\par \par The patient was diagnosed with hyperthyroidism in 2019.  She is being followed by an endocrinologist and was recently increased from 0.75 mcg to 1.25 mcg Synthroid daily.  She will continue testing every 6 to 8 weeks with appropriate medical adjustments.\par \par Abnormal ultrasound findings;\par \par Patient understands that both an intracardiac echogenic focus as well as fetal renal pyelectasis are soft markers associated with fetal aneuploidy.  Patient has had noninterventional prenatal screen performed during this pregnancy which was normal.  In addition a quad screen was also performed and also demonstrated low risk for fetal aneuploidy.  Amniocentesis is again is not recommended.  The patient will return in 6 weeks for follow-up ultrasound to evaluate the low-lying placenta as well as the unilateral fetal renal pyelectasis.\par \par COVID-19 vaccination;\par \par COVID-19 vaccination in pregnancy was discussed.  We advise pregnant patients to be vaccinated.  The patient has had Covid infection previously.  Her antibody status has not been evaluated.  We do advise Covid vaccination even in her clinical setting.  Risks and benefits of the vaccination were discussed and after all the patient's questions were answered the patient is opted for vaccination probably post delivery.\par \par Her mother has multiple cerebral aneurysms.  2 of them have been clipped and 2 remain.  She has hypothyroidism.  She has no previous surgical history.  She has no known allergies to medications and denies alcohol, tobacco or drug use.\par \par I spent a total of 37 minutes reviewing the patient's prenatal record, prenatal blood work, previous consultations and ultrasound reports counseling and coordinating care.\par \par Recommendations;\par \par 1.  Growth scan in 6 weeks is recommended.\par 2.  Patient will continue be evaluated by an endocrinologist for her thyroid disease.\par 3.  Continue current.\par 4.  Follow-up maternal-fetal medicine consultation as clinically indicated.

## 2021-12-28 ENCOUNTER — APPOINTMENT (OUTPATIENT)
Dept: ANTEPARTUM | Facility: CLINIC | Age: 31
End: 2021-12-28
Payer: COMMERCIAL

## 2021-12-28 ENCOUNTER — ASOB RESULT (OUTPATIENT)
Age: 31
End: 2021-12-28

## 2021-12-28 PROCEDURE — 76816 OB US FOLLOW-UP PER FETUS: CPT

## 2022-01-25 ENCOUNTER — APPOINTMENT (OUTPATIENT)
Dept: ANTEPARTUM | Facility: CLINIC | Age: 32
End: 2022-01-25
Payer: COMMERCIAL

## 2022-01-25 ENCOUNTER — ASOB RESULT (OUTPATIENT)
Age: 32
End: 2022-01-25

## 2022-01-25 PROCEDURE — 76816 OB US FOLLOW-UP PER FETUS: CPT

## 2022-02-23 ENCOUNTER — ASOB RESULT (OUTPATIENT)
Age: 32
End: 2022-02-23

## 2022-02-23 ENCOUNTER — APPOINTMENT (OUTPATIENT)
Dept: ANTEPARTUM | Facility: CLINIC | Age: 32
End: 2022-02-23
Payer: COMMERCIAL

## 2022-02-23 PROCEDURE — 76816 OB US FOLLOW-UP PER FETUS: CPT

## 2022-03-10 ENCOUNTER — NON-APPOINTMENT (OUTPATIENT)
Age: 32
End: 2022-03-10

## 2022-03-10 DIAGNOSIS — Z98.890 OTHER SPECIFIED POSTPROCEDURAL STATES: ICD-10-CM

## 2022-03-10 DIAGNOSIS — N91.2 AMENORRHEA, UNSPECIFIED: ICD-10-CM

## 2022-03-10 DIAGNOSIS — R87.810 ATYPICAL SQUAMOUS CELLS OF UNDETERMINED SIGNIFICANCE ON CYTOLOGIC SMEAR OF CERVIX (ASC-US): ICD-10-CM

## 2022-03-10 DIAGNOSIS — Z87.59 PERSONAL HISTORY OF OTHER COMPLICATIONS OF PREGNANCY, CHILDBIRTH AND THE PUERPERIUM: ICD-10-CM

## 2022-03-10 DIAGNOSIS — Z78.9 OTHER SPECIFIED HEALTH STATUS: ICD-10-CM

## 2022-03-10 DIAGNOSIS — R87.610 ATYPICAL SQUAMOUS CELLS OF UNDETERMINED SIGNIFICANCE ON CYTOLOGIC SMEAR OF CERVIX (ASC-US): ICD-10-CM

## 2022-03-10 RX ORDER — LEVOTHYROXINE SODIUM 75 UG/1
75 TABLET ORAL
Refills: 0 | Status: ACTIVE | COMMUNITY

## 2022-03-15 ENCOUNTER — APPOINTMENT (OUTPATIENT)
Dept: OBGYN | Facility: CLINIC | Age: 32
End: 2022-03-15
Payer: COMMERCIAL

## 2022-03-15 VITALS
SYSTOLIC BLOOD PRESSURE: 110 MMHG | HEIGHT: 65 IN | HEART RATE: 80 BPM | DIASTOLIC BLOOD PRESSURE: 69 MMHG | WEIGHT: 178 LBS | BODY MASS INDEX: 29.66 KG/M2

## 2022-03-15 DIAGNOSIS — Z83.79 FAMILY HISTORY OF OTHER DISEASES OF THE DIGESTIVE SYSTEM: ICD-10-CM

## 2022-03-15 DIAGNOSIS — Z34.90 ENCOUNTER FOR SUPERVISION OF NORMAL PREGNANCY, UNSPECIFIED, UNSPECIFIED TRIMESTER: ICD-10-CM

## 2022-03-15 LAB
BILIRUB UR QL STRIP: NORMAL
GLUCOSE UR-MCNC: NORMAL
HCG UR QL: 0.2 EU/DL
HGB UR QL STRIP.AUTO: NORMAL
KETONES UR-MCNC: NORMAL
LEUKOCYTE ESTERASE UR QL STRIP: NORMAL
NITRITE UR QL STRIP: NORMAL
PH UR STRIP: 6
PROT UR STRIP-MCNC: NORMAL
SP GR UR STRIP: 1.02

## 2022-03-15 PROCEDURE — 0502F SUBSEQUENT PRENATAL CARE: CPT

## 2022-03-15 PROCEDURE — 99213 OFFICE O/P EST LOW 20 MIN: CPT

## 2022-03-16 ENCOUNTER — ASOB RESULT (OUTPATIENT)
Age: 32
End: 2022-03-16

## 2022-03-16 ENCOUNTER — APPOINTMENT (OUTPATIENT)
Dept: ANTEPARTUM | Facility: CLINIC | Age: 32
End: 2022-03-16
Payer: COMMERCIAL

## 2022-03-16 PROCEDURE — 76816 OB US FOLLOW-UP PER FETUS: CPT

## 2022-03-21 ENCOUNTER — ASOB RESULT (OUTPATIENT)
Age: 32
End: 2022-03-21

## 2022-03-21 ENCOUNTER — APPOINTMENT (OUTPATIENT)
Dept: OBGYN | Facility: CLINIC | Age: 32
End: 2022-03-21
Payer: COMMERCIAL

## 2022-03-21 VITALS — DIASTOLIC BLOOD PRESSURE: 67 MMHG | HEART RATE: 78 BPM | HEIGHT: 66 IN | SYSTOLIC BLOOD PRESSURE: 107 MMHG

## 2022-03-21 DIAGNOSIS — U07.1 OTHER VIRAL DISEASES COMPLICATING PREGNANCY, FIRST TRIMESTER: ICD-10-CM

## 2022-03-21 DIAGNOSIS — E06.3 AUTOIMMUNE THYROIDITIS: ICD-10-CM

## 2022-03-21 DIAGNOSIS — Z34.02 ENCOUNTER FOR SUPERVISION OF NORMAL FIRST PREGNANCY, SECOND TRIMESTER: ICD-10-CM

## 2022-03-21 DIAGNOSIS — Z3A.19 19 WEEKS GESTATION OF PREGNANCY: ICD-10-CM

## 2022-03-21 DIAGNOSIS — O98.511 OTHER VIRAL DISEASES COMPLICATING PREGNANCY, FIRST TRIMESTER: ICD-10-CM

## 2022-03-21 LAB
BILIRUB UR QL STRIP: NORMAL
GLUCOSE UR-MCNC: NORMAL
HCG UR QL: 0.2 EU/DL
HGB UR QL STRIP.AUTO: NORMAL
KETONES UR-MCNC: NORMAL
LEUKOCYTE ESTERASE UR QL STRIP: NORMAL
NITRITE UR QL STRIP: NORMAL
PH UR STRIP: 6.5
PROT UR STRIP-MCNC: NORMAL
SP GR UR STRIP: 1.02

## 2022-03-21 PROCEDURE — 99213 OFFICE O/P EST LOW 20 MIN: CPT

## 2022-03-21 PROCEDURE — 0502F SUBSEQUENT PRENATAL CARE: CPT

## 2022-03-21 PROCEDURE — 76819 FETAL BIOPHYS PROFIL W/O NST: CPT

## 2022-03-21 PROCEDURE — 76816 OB US FOLLOW-UP PER FETUS: CPT

## 2022-03-24 ENCOUNTER — RESULT REVIEW (OUTPATIENT)
Age: 32
End: 2022-03-24

## 2022-03-24 ENCOUNTER — TRANSCRIPTION ENCOUNTER (OUTPATIENT)
Age: 32
End: 2022-03-24

## 2022-03-24 ENCOUNTER — APPOINTMENT (OUTPATIENT)
Dept: OBGYN | Facility: CLINIC | Age: 32
End: 2022-03-24

## 2022-03-24 ENCOUNTER — INPATIENT (INPATIENT)
Facility: HOSPITAL | Age: 32
LOS: 0 days | Discharge: HOME | End: 2022-03-25
Attending: OBSTETRICS & GYNECOLOGY | Admitting: OBSTETRICS & GYNECOLOGY
Payer: COMMERCIAL

## 2022-03-24 ENCOUNTER — APPOINTMENT (OUTPATIENT)
Dept: OBGYN | Facility: HOSPITAL | Age: 32
End: 2022-03-24

## 2022-03-24 VITALS
RESPIRATION RATE: 20 BRPM | SYSTOLIC BLOOD PRESSURE: 140 MMHG | DIASTOLIC BLOOD PRESSURE: 76 MMHG | HEART RATE: 80 BPM | WEIGHT: 179.9 LBS | TEMPERATURE: 98 F | HEIGHT: 66 IN

## 2022-03-24 DIAGNOSIS — O26.893 OTHER SPECIFIED PREGNANCY RELATED CONDITIONS, THIRD TRIMESTER: ICD-10-CM

## 2022-03-24 DIAGNOSIS — O47.1 FALSE LABOR AT OR AFTER 37 COMPLETED WEEKS OF GESTATION: ICD-10-CM

## 2022-03-24 LAB
BACTERIA UR CULT: NORMAL
BASOPHILS # BLD AUTO: 0.05 K/UL — SIGNIFICANT CHANGE UP (ref 0–0.2)
BASOPHILS NFR BLD AUTO: 0.4 % — SIGNIFICANT CHANGE UP (ref 0–2)
BLD GP AB SCN SERPL QL: SIGNIFICANT CHANGE UP
COVID-19 SPIKE DOMAIN AB INTERP: POSITIVE
COVID-19 SPIKE DOMAIN ANTIBODY RESULT: >250 U/ML — HIGH
EOSINOPHIL # BLD AUTO: 0.06 K/UL — SIGNIFICANT CHANGE UP (ref 0–0.5)
EOSINOPHIL NFR BLD AUTO: 0.4 % — SIGNIFICANT CHANGE UP (ref 0–6)
HCT VFR BLD CALC: 41.9 % — SIGNIFICANT CHANGE UP (ref 34.5–45)
HGB BLD-MCNC: 14.3 G/DL — SIGNIFICANT CHANGE UP (ref 11.5–15.5)
HIV 1 & 2 AB SERPL IA.RAPID: SIGNIFICANT CHANGE UP
IMM GRANULOCYTES NFR BLD AUTO: 0.8 % — SIGNIFICANT CHANGE UP (ref 0–1.5)
LYMPHOCYTES # BLD AUTO: 19.2 % — SIGNIFICANT CHANGE UP (ref 13–44)
LYMPHOCYTES # BLD AUTO: 2.72 K/UL — SIGNIFICANT CHANGE UP (ref 1–3.3)
MCHC RBC-ENTMCNC: 29.8 PG — SIGNIFICANT CHANGE UP (ref 27–34)
MCHC RBC-ENTMCNC: 34.1 GM/DL — SIGNIFICANT CHANGE UP (ref 32–36)
MCV RBC AUTO: 87.3 FL — SIGNIFICANT CHANGE UP (ref 80–100)
MONOCYTES # BLD AUTO: 0.69 K/UL — SIGNIFICANT CHANGE UP (ref 0–0.9)
MONOCYTES NFR BLD AUTO: 4.9 % — SIGNIFICANT CHANGE UP (ref 2–14)
NEUTROPHILS # BLD AUTO: 10.53 K/UL — HIGH (ref 1.8–7.4)
NEUTROPHILS NFR BLD AUTO: 74.3 % — SIGNIFICANT CHANGE UP (ref 43–77)
PLATELET # BLD AUTO: 213 K/UL — SIGNIFICANT CHANGE UP (ref 150–400)
RBC # BLD: 4.8 M/UL — SIGNIFICANT CHANGE UP (ref 3.8–5.2)
RBC # FLD: 14 % — SIGNIFICANT CHANGE UP (ref 10.3–14.5)
SARS-COV-2 IGG+IGM SERPL QL IA: >250 U/ML — HIGH
SARS-COV-2 IGG+IGM SERPL QL IA: POSITIVE
SARS-COV-2 RNA SPEC QL NAA+PROBE: SIGNIFICANT CHANGE UP
T PALLIDUM AB TITR SER: NEGATIVE — SIGNIFICANT CHANGE UP
WBC # BLD: 14.16 K/UL — HIGH (ref 3.8–10.5)
WBC # FLD AUTO: 14.16 K/UL — HIGH (ref 3.8–10.5)

## 2022-03-24 PROCEDURE — 13132 CMPLX RPR F/C/C/M/N/AX/G/H/F: CPT | Mod: 59

## 2022-03-24 PROCEDURE — 59410 OBSTETRICAL CARE: CPT

## 2022-03-24 PROCEDURE — 88307 TISSUE EXAM BY PATHOLOGIST: CPT | Mod: 26

## 2022-03-24 RX ORDER — PRAMOXINE HYDROCHLORIDE 150 MG/15G
1 AEROSOL, FOAM RECTAL EVERY 4 HOURS
Refills: 0 | Status: DISCONTINUED | OUTPATIENT
Start: 2022-03-24 | End: 2022-03-25

## 2022-03-24 RX ORDER — ACETAMINOPHEN 500 MG
975 TABLET ORAL
Refills: 0 | Status: DISCONTINUED | OUTPATIENT
Start: 2022-03-24 | End: 2022-03-25

## 2022-03-24 RX ORDER — OXYCODONE HYDROCHLORIDE 5 MG/1
5 TABLET ORAL
Refills: 0 | Status: DISCONTINUED | OUTPATIENT
Start: 2022-03-24 | End: 2022-03-25

## 2022-03-24 RX ORDER — AER TRAVELER 0.5 G/1
1 SOLUTION RECTAL; TOPICAL EVERY 4 HOURS
Refills: 0 | Status: DISCONTINUED | OUTPATIENT
Start: 2022-03-24 | End: 2022-03-25

## 2022-03-24 RX ORDER — LANOLIN
1 OINTMENT (GRAM) TOPICAL EVERY 6 HOURS
Refills: 0 | Status: DISCONTINUED | OUTPATIENT
Start: 2022-03-24 | End: 2022-03-25

## 2022-03-24 RX ORDER — OXYTOCIN 10 UNIT/ML
333.33 VIAL (ML) INJECTION
Qty: 20 | Refills: 0 | Status: DISCONTINUED | OUTPATIENT
Start: 2022-03-24 | End: 2022-03-24

## 2022-03-24 RX ORDER — BENZOCAINE 10 %
1 GEL (GRAM) MUCOUS MEMBRANE EVERY 6 HOURS
Refills: 0 | Status: DISCONTINUED | OUTPATIENT
Start: 2022-03-24 | End: 2022-03-25

## 2022-03-24 RX ORDER — HYDROCORTISONE 1 %
1 OINTMENT (GRAM) TOPICAL EVERY 6 HOURS
Refills: 0 | Status: DISCONTINUED | OUTPATIENT
Start: 2022-03-24 | End: 2022-03-25

## 2022-03-24 RX ORDER — SENNA PLUS 8.6 MG/1
2 TABLET ORAL AT BEDTIME
Refills: 0 | Status: DISCONTINUED | OUTPATIENT
Start: 2022-03-24 | End: 2022-03-25

## 2022-03-24 RX ORDER — SODIUM CHLORIDE 9 MG/ML
3 INJECTION INTRAMUSCULAR; INTRAVENOUS; SUBCUTANEOUS EVERY 8 HOURS
Refills: 0 | Status: DISCONTINUED | OUTPATIENT
Start: 2022-03-24 | End: 2022-03-25

## 2022-03-24 RX ORDER — DIPHENHYDRAMINE HCL 50 MG
25 CAPSULE ORAL EVERY 6 HOURS
Refills: 0 | Status: DISCONTINUED | OUTPATIENT
Start: 2022-03-24 | End: 2022-03-25

## 2022-03-24 RX ORDER — OXYTOCIN 10 UNIT/ML
333.33 VIAL (ML) INJECTION
Qty: 20 | Refills: 0 | Status: DISCONTINUED | OUTPATIENT
Start: 2022-03-24 | End: 2022-03-25

## 2022-03-24 RX ORDER — IBUPROFEN 200 MG
600 TABLET ORAL EVERY 6 HOURS
Refills: 0 | Status: DISCONTINUED | OUTPATIENT
Start: 2022-03-24 | End: 2022-03-25

## 2022-03-24 RX ORDER — SIMETHICONE 80 MG/1
80 TABLET, CHEWABLE ORAL EVERY 4 HOURS
Refills: 0 | Status: DISCONTINUED | OUTPATIENT
Start: 2022-03-24 | End: 2022-03-25

## 2022-03-24 RX ORDER — DIBUCAINE 1 %
1 OINTMENT (GRAM) RECTAL EVERY 6 HOURS
Refills: 0 | Status: DISCONTINUED | OUTPATIENT
Start: 2022-03-24 | End: 2022-03-25

## 2022-03-24 RX ORDER — MAGNESIUM HYDROXIDE 400 MG/1
30 TABLET, CHEWABLE ORAL
Refills: 0 | Status: DISCONTINUED | OUTPATIENT
Start: 2022-03-24 | End: 2022-03-25

## 2022-03-24 RX ORDER — LEVOTHYROXINE SODIUM 125 MCG
112 TABLET ORAL DAILY
Refills: 0 | Status: DISCONTINUED | OUTPATIENT
Start: 2022-03-24 | End: 2022-03-25

## 2022-03-24 RX ORDER — OXYCODONE HYDROCHLORIDE 5 MG/1
5 TABLET ORAL ONCE
Refills: 0 | Status: DISCONTINUED | OUTPATIENT
Start: 2022-03-24 | End: 2022-03-25

## 2022-03-24 RX ORDER — TETANUS TOXOID, REDUCED DIPHTHERIA TOXOID AND ACELLULAR PERTUSSIS VACCINE, ADSORBED 5; 2.5; 8; 8; 2.5 [IU]/.5ML; [IU]/.5ML; UG/.5ML; UG/.5ML; UG/.5ML
0.5 SUSPENSION INTRAMUSCULAR ONCE
Refills: 0 | Status: DISCONTINUED | OUTPATIENT
Start: 2022-03-24 | End: 2022-03-25

## 2022-03-24 RX ORDER — POLYETHYLENE GLYCOL 3350 17 G/17G
17 POWDER, FOR SOLUTION ORAL DAILY
Refills: 0 | Status: DISCONTINUED | OUTPATIENT
Start: 2022-03-24 | End: 2022-03-25

## 2022-03-24 RX ORDER — SODIUM CHLORIDE 9 MG/ML
1000 INJECTION, SOLUTION INTRAVENOUS
Refills: 0 | Status: DISCONTINUED | OUTPATIENT
Start: 2022-03-24 | End: 2022-03-24

## 2022-03-24 RX ORDER — KETOROLAC TROMETHAMINE 30 MG/ML
30 SYRINGE (ML) INJECTION ONCE
Refills: 0 | Status: DISCONTINUED | OUTPATIENT
Start: 2022-03-24 | End: 2022-03-24

## 2022-03-24 RX ORDER — IBUPROFEN 200 MG
600 TABLET ORAL EVERY 6 HOURS
Refills: 0 | Status: COMPLETED | OUTPATIENT
Start: 2022-03-24 | End: 2023-02-20

## 2022-03-24 RX ORDER — CITRIC ACID/SODIUM CITRATE 300-500 MG
30 SOLUTION, ORAL ORAL ONCE
Refills: 0 | Status: DISCONTINUED | OUTPATIENT
Start: 2022-03-24 | End: 2022-03-24

## 2022-03-24 RX ADMIN — Medication 975 MILLIGRAM(S): at 21:08

## 2022-03-24 RX ADMIN — SODIUM CHLORIDE 3 MILLILITER(S): 9 INJECTION INTRAMUSCULAR; INTRAVENOUS; SUBCUTANEOUS at 14:41

## 2022-03-24 RX ADMIN — Medication 1 TABLET(S): at 14:45

## 2022-03-24 RX ADMIN — SENNA PLUS 2 TABLET(S): 8.6 TABLET ORAL at 21:08

## 2022-03-24 RX ADMIN — Medication 975 MILLIGRAM(S): at 14:45

## 2022-03-24 RX ADMIN — Medication 600 MILLIGRAM(S): at 18:31

## 2022-03-24 RX ADMIN — POLYETHYLENE GLYCOL 3350 17 GRAM(S): 17 POWDER, FOR SOLUTION ORAL at 14:45

## 2022-03-24 RX ADMIN — Medication 30 MILLIGRAM(S): at 10:52

## 2022-03-24 RX ADMIN — Medication 30 MILLIGRAM(S): at 11:07

## 2022-03-24 RX ADMIN — Medication 1000 MILLIUNIT(S)/MIN: at 12:13

## 2022-03-24 NOTE — OB PROVIDER H&P - ASSESSMENT
32 y/o  at 39w2d admitted in active labor.   - consent   - admission labs + COVID testing   - GBS neg, no antibiotic prophylaxis indicated   - known large for gestational age baby, shoulder dystocia precautions taken   - anticipated vaginal delivery   - continuous efm/toco until delivery     d/w Dr. Booth

## 2022-03-24 NOTE — DISCHARGE NOTE OB - MEDICATION SUMMARY - MEDICATIONS TO TAKE
I will START or STAY ON the medications listed below when I get home from the hospital:    acetaminophen 325 mg oral capsule  -- 3 cap(s) by mouth every 6 hours   -- Indication: For pain    ibuprofen 600 mg oral tablet  -- 1 tab(s) by mouth every 6 hours   -- Do not take this drug if you are pregnant.  It is very important that you take or use this exactly as directed.  Do not skip doses or discontinue unless directed by your doctor.  May cause drowsiness or dizziness.  Obtain medical advice before taking any non-prescription drugs as some may affect the action of this medication.  Take with food or milk.    -- Indication: For pain    MiraLax oral powder for reconstitution  -- 17 gram(s) by mouth once a day   -- Dilute this medication with liquid before administration.  It is very important that you take or use this exactly as directed.  Do not skip doses or discontinue unless directed by your doctor.    -- Indication: For stool softener

## 2022-03-24 NOTE — OB PROVIDER H&P - HISTORY OF PRESENT ILLNESS
30 y/o  at 39w2d (dated by first trimester sono) presenting with painful contractions. Denies leakage of fluid or vaginal bleeding, +FM.   JAKY: 3/29    Prenatal care uncomplicated.     ObHx:   G1: 2020, , 6lbs 14oz, uncomplicated   GynHx: hx of +HPV, denies history of abnormal pap smears, fibroids, endometriosis, or ovarian cysts; denies history of STD's   PMH/PSH: hashimotos, vitamin d deficiency   Meds: PNV, synthroid   Allergies: NKDA   SocHx: denies use of EtOH, illicit trugs, or tobacco during this pregnancy or prior; denies any hx of anxiety or depression; feels safe at home

## 2022-03-24 NOTE — OB PROVIDER DELIVERY SUMMARY - AMNIOTIC FLUID AMOUNT, LABOR
Hold Insulin  High carbohydrate diet  Monitor FS  Endocrine consult (Type I DM with chronicly low fs and worsening symptoms - pt has been decreasing her Insulin from 50 to 35Un and still hypoglycemia (found on floor) within normal limits

## 2022-03-24 NOTE — DISCHARGE NOTE OB - PATIENT PORTAL LINK FT
You can access the FollowMyHealth Patient Portal offered by [facility] by registering at the following website: [email or web address]. By joining [employer]’s FollowMyHealth portal, you will also be able to view your health information using other applications (apps) compatible with our system.

## 2022-03-24 NOTE — OB RN DELIVERY SUMMARY - NS_SEPSISRSKCALC_OBGYN_ALL_OB_FT
No gestational age at birth has been documented. Ensure delivery date/time has been entered above.  GBS status in the 'Prenatal Lab tests/results section' on the OB RN Patient Profile must be documented.   EOS calculated successfully. EOS Risk Factor: 0.5/1000 live births ([facility] national incidence); GA=39w2d; Temp=97.7; ROM=0.433; GBS='Negative'; Antibiotics='No antibiotics or any antibiotics < 2 hrs prior to birth'

## 2022-03-24 NOTE — DISCHARGE NOTE OB - HOSPITAL COURSE
Patient underwent a normal spontaneous vaginal delivery complicated by third degree perineal laceration, patient discharged on miralax. Post-partum course was uncomplicated. Pain is well controlled with PRN medication. She has no difficulty with ambulation, voiding, or PO intake. Lab values and vital signs are within normal limits prior to discharge.

## 2022-03-24 NOTE — OB RN DELIVERY SUMMARY - NSSELHIDDEN_OBGYN_ALL_OB_FT
[[email or web address]_ALL_OB_FT:[id number]],[NS_DeliveryAssist1_OBGYN_ALL_OB_FT:[id number]],[NS_DeliveryRN_OBGYN_ALL_OB_FT:[id number]]

## 2022-03-24 NOTE — OB PROVIDER DELIVERY SUMMARY - NSPROVIDERDELIVERYNOTE_OBGYN_ALL_OB_FT
Procedure: Normal vaginal delivery  Findings: Viable male infant delivered in cephalic presentation at 1036, placenta delivered at 1038. Apgar 9/9. Nuchal cord x1. Weight deferred for skin-to-skin.   Laceration: ***  Repair: ***  Procedure: 32 y/o  at 39w2d presented with painful contractions and rectal pressure/urge to push. She was examined and found to be fully dilated, +2 station. She pushed effectively for 20 minutes. In conjunction with maternal effort, she delivered a viable male infant, loose nuchal cord X 1 reduced on delivery of the shoulders. Placenta delivered intact, meconium noted, sent to pathology for evaluation. Perineum and vagina were inspected, ***. Excellent hemostasis obtained.  QBL: Procedure: Normal vaginal delivery  Findings: Viable male infant delivered in cephalic presentation at 1036, placenta delivered at 1038. Apgar 9/9. Nuchal cord x1. Weight deferred for skin-to-skin.   Laceration: ***  Repair: ***  Procedure: 30 y/o  at 39w2d presented with painful contractions and rectal pressure/urge to push. She was examined and found to be fully dilated, +2 station. She pushed effectively for 20 minutes. In conjunction with maternal effort, she delivered a viable male infant, loose nuchal cord X 1 reduced on delivery of the shoulders. Placenta delivered intact, meconium noted, sent to pathology for evaluation. Perineum and vagina were inspected, ***. Excellent hemostasis obtained.  QBL: 211 Procedure: Normal vaginal delivery  Findings: Viable male infant delivered in cephalic presentation at 1036, placenta delivered at 1038. Apgar 9/9. Nuchal cord x1. Weight deferred for skin-to-skin.   Laceration: midline episiotomy with extension to 3rd degree, right sulcal  32 y/o  at 39w2d presented with painful contractions and rectal pressure/urge to push. She was examined and found to be fully dilated, +2 station. She pushed effectively for 20 minutes. Midline episiotomy performed. In conjunction with maternal effort, she delivered a viable male infant, loose nuchal cord X 1 reduced on delivery of the shoulders. Placenta delivered intact, meconium noted, sent to pathology for evaluation. Perineum and vagina were inspected, midline episiotomy extended to 3rd degree laceration. Right sulcal laceration also noted. Both reapproximated with deep vicryl sutures and interuped chromic sutures for skin. Excellent hemostasis obtained.  QBL: 211

## 2022-03-24 NOTE — OB RN DELIVERY SUMMARY - AS DELIV COMPLICATIONS OB
nuchal cord Closure 3 Information: This tab is for additional flaps and grafts above and beyond our usual structured repairs.  Please note if you enter information here it will not currently bill and you will need to add the billing information manually.

## 2022-03-24 NOTE — OB RN PATIENT PROFILE - ANTIBODY SCREEN: DATE, OB PROFILE
Faxed covid results to [facility] department.       Jerel Jaquez  04/04/21 [id number]
30-Sep-2021

## 2022-03-24 NOTE — DISCHARGE NOTE OB - CARE PROVIDER_API CALL
Alejandro Booth)  Obstetrics and Gynecology  [address] [place] [address]  Phone: [phone]  Fax: [phone]  Follow Up Time:

## 2022-03-24 NOTE — OB PROVIDER DELIVERY SUMMARY - NSSELHIDDEN_OBGYN_ALL_OB_FT
[NS_DeliveryAttending1_OBGYN_ALL_OB_FT:[id number]==],[NS_DeliveryAssist1_OBGYN_ALL_OB_FT:[id number]=],[NS_DeliveryRN_OBGYN_ALL_OB_FT:[id number]==]

## 2022-03-24 NOTE — OB PROVIDER H&P - NSHPPHYSICALEXAM_GEN_ALL_CORE
Vital Signs Last 24 Hrs  T(C): 36.5 (24 Mar 2022 10:22), Max: 36.5 (24 Mar 2022 10:22)  T(F): 97.7 (24 Mar 2022 10:22), Max: 97.7 (24 Mar 2022 10:22)  HR: 82 (24 Mar 2022 11:15) (80 - 82)  BP: 104/70 (24 Mar 2022 11:15) (104/70 - 140/76)  RR: 16 (24 Mar 2022 10:22) (16 - 20)    Gen: NAD  Cardio: RRR  Lungs: CTAB   Abdomen: gravid, +palpable contractions   Ext: nontender lower extremities bilaterally  SVE: 10/100/+1

## 2022-03-24 NOTE — DISCHARGE NOTE OB - NS MD DC FALL RISK RISK
For information on Fall & Injury Prevention, visit: [email or web address] OR  [email or web address] OR  https://www.cdc.gov/steadi/patient.html

## 2022-03-24 NOTE — OB RN PATIENT PROFILE - NSICDXPASTMEDICALHX_GEN_ALL_CORE_FT
PAST MEDICAL HISTORY:  Ectopic pregnancy 2016    Hashimoto's disease      (normal spontaneous vaginal delivery)

## 2022-03-24 NOTE — OB RN PATIENT PROFILE - FALL HARM RISK - UNIVERSAL INTERVENTIONS
Bed in lowest position, wheels locked, appropriate side rails in place/Call bell, personal items and telephone in reach/Instruct patient to call for assistance before getting out of bed or chair/Non-slip footwear when patient is out of bed/[place] to call system/Physically safe environment - no spills, clutter or unnecessary equipment/Purposeful Proactive Rounding/Room/bathroom lighting operational, light cord in reach

## 2022-03-25 ENCOUNTER — NON-APPOINTMENT (OUTPATIENT)
Age: 32
End: 2022-03-25

## 2022-03-25 VITALS
TEMPERATURE: 98 F | SYSTOLIC BLOOD PRESSURE: 100 MMHG | DIASTOLIC BLOOD PRESSURE: 56 MMHG | RESPIRATION RATE: 16 BRPM | OXYGEN SATURATION: 96 % | HEART RATE: 78 BPM

## 2022-03-25 LAB
HCT VFR BLD CALC: 29 % — LOW (ref 34.5–45)
HGB BLD-MCNC: 9.6 G/DL — LOW (ref 11.5–15.5)
HIV 1+2 AB+HIV1 P24 AG SERPL QL IA: SIGNIFICANT CHANGE UP

## 2022-03-25 PROCEDURE — 86765 RUBEOLA ANTIBODY: CPT

## 2022-03-25 PROCEDURE — 86703 HIV-1/HIV-2 1 RESULT ANTBDY: CPT

## 2022-03-25 PROCEDURE — 88307 TISSUE EXAM BY PATHOLOGIST: CPT

## 2022-03-25 PROCEDURE — 36415 COLL VENOUS BLD VENIPUNCTURE: CPT

## 2022-03-25 PROCEDURE — 59050 FETAL MONITOR W/REPORT: CPT

## 2022-03-25 PROCEDURE — 85018 HEMOGLOBIN: CPT

## 2022-03-25 PROCEDURE — 85025 COMPLETE CBC W/AUTO DIFF WBC: CPT

## 2022-03-25 PROCEDURE — 59025 FETAL NON-STRESS TEST: CPT

## 2022-03-25 PROCEDURE — 86850 RBC ANTIBODY SCREEN: CPT

## 2022-03-25 PROCEDURE — G0463: CPT

## 2022-03-25 PROCEDURE — 86900 BLOOD TYPING SEROLOGIC ABO: CPT

## 2022-03-25 PROCEDURE — 86769 SARS-COV-2 COVID-19 ANTIBODY: CPT

## 2022-03-25 PROCEDURE — U0005: CPT

## 2022-03-25 PROCEDURE — 87389 HIV-1 AG W/HIV-1&-2 AB AG IA: CPT

## 2022-03-25 PROCEDURE — 85014 HEMATOCRIT: CPT

## 2022-03-25 PROCEDURE — 86762 RUBELLA ANTIBODY: CPT

## 2022-03-25 PROCEDURE — U0003: CPT

## 2022-03-25 PROCEDURE — 86780 TREPONEMA PALLIDUM: CPT

## 2022-03-25 PROCEDURE — 86901 BLOOD TYPING SEROLOGIC RH(D): CPT

## 2022-03-25 RX ORDER — ACETAMINOPHEN 500 MG
3 TABLET ORAL
Qty: 36 | Refills: 0
Start: 2022-03-25 | End: 2022-03-27

## 2022-03-25 RX ORDER — IBUPROFEN 200 MG
1 TABLET ORAL
Qty: 28 | Refills: 0
Start: 2022-03-25 | End: 2022-03-31

## 2022-03-25 RX ORDER — POLYETHYLENE GLYCOL 3350 17 G/17G
17 POWDER, FOR SOLUTION ORAL
Qty: 238 | Refills: 0
Start: 2022-03-25 | End: 2022-04-07

## 2022-03-25 RX ADMIN — Medication 600 MILLIGRAM(S): at 18:48

## 2022-03-25 RX ADMIN — Medication 112 MICROGRAM(S): at 05:24

## 2022-03-25 RX ADMIN — Medication 975 MILLIGRAM(S): at 15:08

## 2022-03-25 RX ADMIN — POLYETHYLENE GLYCOL 3350 17 GRAM(S): 17 POWDER, FOR SOLUTION ORAL at 11:31

## 2022-03-25 RX ADMIN — Medication 975 MILLIGRAM(S): at 10:29

## 2022-03-25 RX ADMIN — Medication 600 MILLIGRAM(S): at 05:24

## 2022-03-25 RX ADMIN — Medication 600 MILLIGRAM(S): at 17:46

## 2022-03-25 RX ADMIN — Medication 600 MILLIGRAM(S): at 12:10

## 2022-03-25 RX ADMIN — Medication 975 MILLIGRAM(S): at 02:01

## 2022-03-25 RX ADMIN — Medication 1 TABLET(S): at 11:31

## 2022-03-25 RX ADMIN — Medication 600 MILLIGRAM(S): at 11:31

## 2022-03-25 RX ADMIN — Medication 975 MILLIGRAM(S): at 09:11

## 2022-03-25 RX ADMIN — Medication 975 MILLIGRAM(S): at 16:00

## 2022-03-25 NOTE — PROGRESS NOTE ADULT - ATTENDING COMMENTS
PT seen by Dr Booth this am.   VSS  Desires circ for male infant - Dr Griffin to do  possible d/c home    Amanda Fofana MD

## 2022-03-25 NOTE — PROGRESS NOTE ADULT - SUBJECTIVE AND OBJECTIVE BOX
PPD # 1    Patient resting comfortably in NAD  Afebrile VSS  Abdom.  Soft  Not tender  Fundus  Firm  Extrem.  No Homans  Lochia  Nl  Flatus ++  Voiding ++    H / H   9.6 / 29  O ++  HIV Neg  VDRL  Neg  COVID  Neg    Patient s/p   Patient stable and doing well  DC to home   F/U office 4 weeks  
ABIOLA ALONZO is a 31y  now PPD#1 s/p spontaneous vaginal delivery at 39w2d gestation, complicated by 3rd degree laceration.    S:    No acute events overnight.   The patient has no complaints.  Pain controlled with current treatment regimen.   She is ambulating without difficulty and tolerating PO.   + flatus/-BM/+ voiding   She endorses appropriate lochia, which is decreasing.   She is breastfeeding without difficulty.   She denies fevers, chills, nausea and vomiting.   She denies lightheadedness, dizziness, palpitations, chest pain and SOB.     O:    T(C): 36.8 (22 @ 03:33), Max: 36.8 (22 @ 14:01)  HR: 78 (22 @ 03:33) (65 - 82)  BP: 100/56 (22 @ 03:33) (97/55 - 140/76)  RR: 16 (22 @ 03:33) (14 - 20)  SpO2: 96% (22 @ 03:33) (96% - 100%)    Gen: NAD, AOx3  CV: RRR, S1/S2 present  Pulm: CTAB  Abdomen:  Soft, non-tender, non-distended, +bowel sounds  Uterus:  Fundus firm below umbilicus  VE:  Expected lochia  Ext:  b/l LE non-tender                           14.3   14.16 )-----------( 213      ( 24 Mar 2022 10:32 )             41.9

## 2022-03-25 NOTE — PROGRESS NOTE ADULT - ASSESSMENT
A/P:  ABIOLA ALONZO is a 31y  now PPD#1 s/p spontaneous vaginal delivery at 39w2d gestation, complicated by 3rd degree laceration.  -Vital signs stable  -Hgb: 14.3 -> AM labs pending   -Bowel regimen: Miralax and senna  -Voiding, tolerating PO  -Advance care as tolerated   -Continue routine postpartum care and education  -Healthy male infant, desires circumcision  -Dispo: Patient to be discharged when meeting all postpartum milestones and pending attending approval.

## 2022-03-26 LAB
MEV IGG SER-ACNC: 43.6 AU/ML — SIGNIFICANT CHANGE UP
MEV IGG+IGM SER-IMP: POSITIVE — SIGNIFICANT CHANGE UP
RUBV IGG SER-ACNC: 1.1 INDEX — SIGNIFICANT CHANGE UP
RUBV IGG SER-IMP: POSITIVE — SIGNIFICANT CHANGE UP

## 2022-04-02 LAB — SURGICAL PATHOLOGY STUDY: SIGNIFICANT CHANGE UP

## 2022-04-07 ENCOUNTER — APPOINTMENT (OUTPATIENT)
Dept: OBGYN | Facility: CLINIC | Age: 32
End: 2022-04-07
Payer: COMMERCIAL

## 2022-04-07 VITALS
HEIGHT: 66 IN | WEIGHT: 162 LBS | BODY MASS INDEX: 26.03 KG/M2 | SYSTOLIC BLOOD PRESSURE: 109 MMHG | HEART RATE: 90 BPM | DIASTOLIC BLOOD PRESSURE: 69 MMHG

## 2022-04-07 DIAGNOSIS — N39.0 URINARY TRACT INFECTION, SITE NOT SPECIFIED: ICD-10-CM

## 2022-04-07 PROCEDURE — 0503F POSTPARTUM CARE VISIT: CPT

## 2022-04-07 RX ORDER — CEPHALEXIN 500 MG/1
500 TABLET ORAL 3 TIMES DAILY
Qty: 21 | Refills: 0 | Status: ACTIVE | COMMUNITY
Start: 2022-04-07 | End: 1900-01-01

## 2022-04-07 NOTE — HISTORY OF PRESENT ILLNESS
[Postpartum Follow Up] : postpartum follow up [Breastfeeding] : currently nursing [Last Pap Date: ___] : Last Pap Date: [unfilled] [Delivery Date: ___] : on [unfilled] [] : delivered by vaginal delivery [Male] : Delivery History: baby boy [Wt. ___] : weighing [unfilled] [Episiotomy Site Pain] : episiotomy site pain [Dysuria] : dysuria [None] : no vaginal bleeding [Normal] : the vagina was normal [Not Done] : Examination of breasts not done [Complications:___] : no complications [Rhogam] : Rhogam was not administered [Rubella Vaccine] : Rubella vaccine was not administered [Pertussis Vaccine] : Pertussis vaccine was not administered [BTL] : no tubal ligation [Abdominal Pain] : no abdominal pain [Back Pain] : no back pain [Chills] : no chills [Fatigue] : no fatigue [Fever] : no fever [Headache] : no headache [Nausea] : no nausea [Vomiting] : no vomiting [Healing Well] : is not healing well [Cervix Sample Taken] : cervical sample not taken for a Pap smear [FreeTextEntry1] : I

## 2022-04-07 NOTE — END OF VISIT
[FreeTextEntry3] : Patient presents after vaginal delivery\par Patient complaining of some lower abdominal cramping and some discomfort with urination\par Urine culture obtained\par Patient will be started on Keflex 503 times a day for 7 days\par Episiotomy site healing well\par Follow-up 2 weeks

## 2022-04-09 LAB — BACTERIA UR CULT: NORMAL

## 2022-04-21 ENCOUNTER — APPOINTMENT (OUTPATIENT)
Dept: OBGYN | Facility: CLINIC | Age: 32
End: 2022-04-21
Payer: COMMERCIAL

## 2022-04-21 VITALS
HEART RATE: 57 BPM | BODY MASS INDEX: 25.82 KG/M2 | WEIGHT: 160 LBS | SYSTOLIC BLOOD PRESSURE: 101 MMHG | DIASTOLIC BLOOD PRESSURE: 64 MMHG

## 2022-04-21 PROCEDURE — 0503F POSTPARTUM CARE VISIT: CPT

## 2022-04-21 NOTE — HISTORY OF PRESENT ILLNESS
[Last Pap Date: ___] : Last Pap Date: [unfilled] [Wt. ___] : weighing [unfilled] [Abdominal Pain] : no abdominal pain [Back Pain] : no back pain [Breast Pain] : no breast pain [BreastFeeding Problems] : no breastfeeding problems [Chest Pain] : no chest pain [No [place]] : to avoid sexual intercourse [No Tampons/Suppositories] : against using tampons or vaginal suppositories [No Work] : not to work [Unlimited Housework] : to do housework without limitations [No Sports] : not to participate in sports [Postpartum Follow Up] : postpartum follow up [Complications:___] : no complications [Delivery Date: ___] : on [unfilled] [] : delivered by vaginal delivery [Male] : Delivery History: baby boy [Breastfeeding] : not currently nursing [Chills] : no chills [Back to Normal] : is back to normal in size [None] : no vaginal bleeding [Normal] : the vagina was normal [Healing Well] : is healing well [Cervix Sample Taken] : cervical sample not taken for a Pap smear [Not Done] : Examination of breasts not done [Doing Well] : is doing well [No Sign of Infection] : is showing no signs of infection [Sutures Removed] : sutures were not removed [Staples Removed] : staples were not removed

## 2022-05-23 ENCOUNTER — APPOINTMENT (OUTPATIENT)
Dept: OBGYN | Facility: CLINIC | Age: 32
End: 2022-05-23
Payer: COMMERCIAL

## 2022-05-23 VITALS
HEIGHT: 66 IN | SYSTOLIC BLOOD PRESSURE: 105 MMHG | HEART RATE: 75 BPM | DIASTOLIC BLOOD PRESSURE: 70 MMHG | BODY MASS INDEX: 25.71 KG/M2 | WEIGHT: 160 LBS

## 2022-05-23 PROCEDURE — 0503F POSTPARTUM CARE VISIT: CPT

## 2022-05-23 RX ORDER — METRONIDAZOLE 7.5 MG/G
0.75 GEL VAGINAL
Qty: 1 | Refills: 0 | Status: ACTIVE | COMMUNITY
Start: 2022-05-23 | End: 1900-01-01

## 2022-05-23 NOTE — HISTORY OF PRESENT ILLNESS
[Postpartum Follow Up] : postpartum follow up [Complications:___] : no complications [Delivery Date: ___] : on [unfilled] [Vacuum Assist] : delivered by vaginal delivery using vaccum assist [Female] : Delivery History: baby girl [Breastfeeding] : not currently nursing [Back to Normal] : is still enlarged [None] : no vaginal bleeding [Healing Well] : is healing well [Cervix Sample Taken] : cervical sample not taken for a Pap smear [Not Done] : Examination of breasts not done [Doing Well] : is doing well [de-identified] : min. dc at episiotomy site         RX metrogel vaginal cream [de-identified] : f/u 2 weeks

## 2022-05-23 NOTE — HISTORY OF PRESENT ILLNESS
[Postpartum Follow Up] : postpartum follow up [Complications:___] : no complications [Delivery Date: ___] : on [unfilled] [Vacuum Assist] : delivered by vaginal delivery using vaccum assist [Female] : Delivery History: baby girl [Breastfeeding] : not currently nursing [Back to Normal] : is still enlarged [None] : no vaginal bleeding [Healing Well] : is healing well [Cervix Sample Taken] : cervical sample not taken for a Pap smear [Not Done] : Examination of breasts not done [Doing Well] : is doing well [de-identified] : min. dc at episiotomy site         RX metrogel vaginal cream [de-identified] : f/u 2 weeks

## 2022-06-03 ENCOUNTER — APPOINTMENT (OUTPATIENT)
Dept: OBGYN | Facility: CLINIC | Age: 32
End: 2022-06-03
Payer: COMMERCIAL

## 2022-06-03 VITALS
HEIGHT: 66 IN | SYSTOLIC BLOOD PRESSURE: 98 MMHG | WEIGHT: 160 LBS | DIASTOLIC BLOOD PRESSURE: 67 MMHG | HEART RATE: 62 BPM | BODY MASS INDEX: 25.71 KG/M2

## 2022-06-03 PROCEDURE — 0503F POSTPARTUM CARE VISIT: CPT

## 2022-06-03 NOTE — HISTORY OF PRESENT ILLNESS
[] : delivered by vaginal delivery [Male] : Delivery History: baby boy [Wt. ___] : weighing [unfilled] [Breastfeeding] : not currently nursing [None] : No associated symptoms are reported [Back to Normal] : is back to normal in size [Normal] : the vagina was normal [Healing Well] : is healing well [Not Done] : Examination of breasts not done [Doing Well] : is doing well [de-identified] : benign exam   healing well  firmness at episiotomy site [de-identified] : f/u 6 mo [Postpartum Follow Up] : postpartum follow up [Complications:___] : complications include: [unfilled] [Delivery Date: ___] : on [unfilled]

## 2022-11-07 NOTE — OB RN DELIVERY SUMMARY - NS_SPECIMENS_OBGYN_ALL_OB
Number Of Freeze-Thaw Cycles: 3 freeze-thaw cycles Show Aperture Variable?: Yes Duration Of Freeze Thaw-Cycle (Seconds): 3 Render Note In Bullet Format When Appropriate: No Detail Level: Simple Post-Care Instructions: I reviewed with the patient in detail post-care instructions. Patient is to wear sunprotection, and avoid picking at any of the treated lesions. Pt may apply Vaseline to crusted or scabbing areas. Consent: The patient's consent was obtained including but not limited to risks of crusting, scabbing, blistering, scarring, darker or lighter pigmentary change, recurrence, incomplete removal and infection. No

## 2022-12-05 ENCOUNTER — APPOINTMENT (OUTPATIENT)
Dept: OBGYN | Facility: CLINIC | Age: 32
End: 2022-12-05

## 2023-01-10 ENCOUNTER — NON-APPOINTMENT (OUTPATIENT)
Age: 33
End: 2023-01-10

## 2023-05-19 NOTE — OB PROVIDER TRIAGE NOTE - NS_OBACUITY_OBGYN_ALL_OB_DT
04-Aug-2020 19:22 Opioid Pregnancy And Lactation Text: These medications can lead to premature delivery and should be avoided during pregnancy. These medications are also present in breast milk in small amounts.

## 2024-03-05 NOTE — OB RN TRIAGE NOTE - NS_PARA_OBGYN_ALL_OB_NU
Abdomen , soft, nontender, nondistended , no guarding or rigidity , no masses palpable , normal bowel sounds , Liver and Spleen , no hepatomegaly present , no hepatosplenomegaly , liver nontender , spleen not palpable, No Ascites, No hernia , soft, nontender, nondistended , no masses palpable , normal bowel sounds , Liver and Spleen 0

## 2024-07-15 NOTE — OB PROVIDER H&P - NS_BEFORE39WEEKS_OBGYN_ALL_OB
Pt called and was wondering if a f/u appt is necessary after lab results. results showed unknown mass on thyroid    PLEASE ADVISE     No

## 2024-12-25 PROBLEM — F10.90 ALCOHOL USE: Status: INACTIVE | Noted: 2022-03-15

## 2025-06-19 ENCOUNTER — APPOINTMENT (OUTPATIENT)
Dept: OBGYN | Facility: CLINIC | Age: 35
End: 2025-06-19